# Patient Record
Sex: FEMALE | Race: BLACK OR AFRICAN AMERICAN | NOT HISPANIC OR LATINO | Employment: UNEMPLOYED | URBAN - METROPOLITAN AREA
[De-identification: names, ages, dates, MRNs, and addresses within clinical notes are randomized per-mention and may not be internally consistent; named-entity substitution may affect disease eponyms.]

---

## 2021-03-23 LAB — HUMAN PAPILLOMAVIRUS (HPV): POSITIVE

## 2021-04-12 LAB — POC BETA-HCG (QUAL): NEGATIVE

## 2022-04-10 ENCOUNTER — HISTORICAL (OUTPATIENT)
Dept: ADMINISTRATIVE | Facility: HOSPITAL | Age: 32
End: 2022-04-10

## 2022-04-25 VITALS
SYSTOLIC BLOOD PRESSURE: 111 MMHG | WEIGHT: 125.13 LBS | BODY MASS INDEX: 22.17 KG/M2 | DIASTOLIC BLOOD PRESSURE: 69 MMHG | HEIGHT: 63 IN

## 2022-09-19 ENCOUNTER — HISTORICAL (OUTPATIENT)
Dept: ADMINISTRATIVE | Facility: HOSPITAL | Age: 32
End: 2022-09-19

## 2023-01-26 ENCOUNTER — DOCUMENTATION ONLY (OUTPATIENT)
Dept: ADMINISTRATIVE | Facility: HOSPITAL | Age: 33
End: 2023-01-26
Payer: MEDICAID

## 2023-04-04 ENCOUNTER — OFFICE VISIT (OUTPATIENT)
Dept: OBSTETRICS AND GYNECOLOGY | Facility: CLINIC | Age: 33
End: 2023-04-04
Payer: MEDICAID

## 2023-04-04 VITALS
WEIGHT: 150.13 LBS | HEIGHT: 63 IN | SYSTOLIC BLOOD PRESSURE: 100 MMHG | DIASTOLIC BLOOD PRESSURE: 62 MMHG | BODY MASS INDEX: 26.6 KG/M2 | HEART RATE: 64 BPM

## 2023-04-04 DIAGNOSIS — Z01.419 ROUTINE GYNECOLOGICAL EXAMINATION: Primary | ICD-10-CM

## 2023-04-04 PROCEDURE — 3074F SYST BP LT 130 MM HG: CPT | Mod: CPTII,,, | Performed by: OBSTETRICS & GYNECOLOGY

## 2023-04-04 PROCEDURE — 3008F PR BODY MASS INDEX (BMI) DOCUMENTED: ICD-10-PCS | Mod: CPTII,,, | Performed by: OBSTETRICS & GYNECOLOGY

## 2023-04-04 PROCEDURE — 3008F BODY MASS INDEX DOCD: CPT | Mod: CPTII,,, | Performed by: OBSTETRICS & GYNECOLOGY

## 2023-04-04 PROCEDURE — 99395 PREV VISIT EST AGE 18-39: CPT | Mod: ,,, | Performed by: OBSTETRICS & GYNECOLOGY

## 2023-04-04 PROCEDURE — 1160F RVW MEDS BY RX/DR IN RCRD: CPT | Mod: CPTII,,, | Performed by: OBSTETRICS & GYNECOLOGY

## 2023-04-04 PROCEDURE — 3078F DIAST BP <80 MM HG: CPT | Mod: CPTII,,, | Performed by: OBSTETRICS & GYNECOLOGY

## 2023-04-04 PROCEDURE — 3074F PR MOST RECENT SYSTOLIC BLOOD PRESSURE < 130 MM HG: ICD-10-PCS | Mod: CPTII,,, | Performed by: OBSTETRICS & GYNECOLOGY

## 2023-04-04 PROCEDURE — 99395 PR PREVENTIVE VISIT,EST,18-39: ICD-10-PCS | Mod: ,,, | Performed by: OBSTETRICS & GYNECOLOGY

## 2023-04-04 PROCEDURE — 1160F PR REVIEW ALL MEDS BY PRESCRIBER/CLIN PHARMACIST DOCUMENTED: ICD-10-PCS | Mod: CPTII,,, | Performed by: OBSTETRICS & GYNECOLOGY

## 2023-04-04 PROCEDURE — 3078F PR MOST RECENT DIASTOLIC BLOOD PRESSURE < 80 MM HG: ICD-10-PCS | Mod: CPTII,,, | Performed by: OBSTETRICS & GYNECOLOGY

## 2023-04-04 PROCEDURE — 1159F PR MEDICATION LIST DOCUMENTED IN MEDICAL RECORD: ICD-10-PCS | Mod: CPTII,,, | Performed by: OBSTETRICS & GYNECOLOGY

## 2023-04-04 PROCEDURE — 1159F MED LIST DOCD IN RCRD: CPT | Mod: CPTII,,, | Performed by: OBSTETRICS & GYNECOLOGY

## 2023-04-04 NOTE — PROGRESS NOTES
Patient ID: [id number]   Chief Complaint: Annual exam  Chief Complaint   Patient presents with    Annual Exam     PRESENTS TO CLINIC FOR ANNUAL EXAM.NO C/O'S.     HPI:   Minna Liang is a 32 y.o. year old  here for her Annual Exam. Patient's last menstrual period was 03/10/2023 (approximate). She is doing well. Denies any health changes. NO C/O'S. She would like to conceive. It's been almost 7 years since she's last conceived. She is having regular cycles and has been trying with the same partner. She would like to have her tubes evaluated as she has had PID in the past. She had no histroy of endometriosis.     Subjective:   History reviewed. No pertinent past medical history.  Past Surgical History:   Procedure Laterality Date     SECTION       Social History     Tobacco Use    Smoking status: Never     Passive exposure: Never    Smokeless tobacco: Never   Substance Use Topics    Alcohol use: Never    Drug use: Never     Family History   Problem Relation Age of Onset    Cancer Mother     Hypertension Mother      OB History    Para Term  AB Living   4 4 4     4   SAB IAB Ectopic Multiple Live Births           4      # Outcome Date GA Lbr Omi/2nd Weight Sex Delivery Anes PTL Lv   4 Term 08/10/16    M CS-Unspec EPI N RENEE      Complications: Failure to Progress in Second Stage   3 Term 07/19/15   2.722 kg (6 lb) F Vag-Spont EPI N RENEE   2 Term 14   2.722 kg (6 lb) F Vag-Spont EPI N RENEE   1 Term 13   3.175 kg (7 lb) F Vag-Spont EPI N RENEE     No current outpatient medications on file.    MENARCHEAL:  Cycle Length: 5 days   Flow: normalDysmenorrhea: No  If yes: Mild  Intermenstrual Bleeding: No    PAP:  Last PAP: No result found  History of Abnormal PAP Smear: YES:      Treated:   HPV Vaccine: NO    INTERCOURSE:  Dyspareunia: No  Postcoital Bleeding: No  History of STI: Yes   If yes, then: CZ  Current Birth Control Method: none  Sexually Active: yes    Review of Systems 12 point  "review of systems conducted, negative except as stated in the history of present illness. See HPI for details.  Objective:   Visit Vitals  /62   Pulse 64   Ht 5' 3" (1.6 m)   Wt 68.1 kg (150 lb 2.1 oz)   LMP 03/10/2023 (Approximate)   BMI 26.59 kg/m²     Physical Exam:  Physical Exam  Constitutional:  General Appearance : alert, in no acute distress, normal, well nourished.  Respiratory:  Respiratory Effort: normal.  Breast:  Right: Inspection/palpation: no discharge, no masses present, no nipple retraction, no skin changes, no skin dimpling, no tenderness, no lymphadenopathy, no axillary mass, no axillary tenderness.  Left: Inspection/palpation: no discharge, no masses present, no nipple retraction, no skin changes, no skin dimpling, no tenderness, no lymphadenopathy, no axillary mass, no axillary tenderness.  Gastrointestinal:  Abdomen: no masses. no tender, nondistended.  Liver and spleen: normal  Hernias: no hernias present, no inguinal adenopathy.  Genitourinary:  External Genitalia: normal, no lesions.  Vagina: normal appearance, no abnormal discharge, no lesions.  Bladder: no mass, nontender.  Urethra: no erythema or lesions present.  Cervix: no lesions, non tender. Pap and cxs Done  Uterus: nontender, normal contour, normal mobility, normal size.   Adnexa: no masses, no tenderness.  Anus and Perineum: visually normal.   Chaperone Present    No results found for this or any previous visit (from the past 24 hour(s)).  Assessment/Plan:   Assessment:   Routine gynecological examination    Order for HSG provided. To [facility] at [facility] [place].     Follow up in about 1 year (around 4/4/2024). In addition to their scheduled FU, the patient has also been instructed to follow up on as needed basis  All questions were answered and the patient voiced understanding of the above issues.                 "

## 2023-04-10 ENCOUNTER — DOCUMENTATION ONLY (OUTPATIENT)
Dept: OBSTETRICS AND GYNECOLOGY | Facility: CLINIC | Age: 33
End: 2023-04-10
Payer: MEDICAID

## 2023-04-10 LAB — PSYCHE PATHOLOGY RESULT: NORMAL

## 2024-06-04 ENCOUNTER — OFFICE VISIT (OUTPATIENT)
Dept: OBSTETRICS AND GYNECOLOGY | Facility: CLINIC | Age: 34
End: 2024-06-04
Payer: MEDICAID

## 2024-06-04 VITALS
HEART RATE: 90 BPM | SYSTOLIC BLOOD PRESSURE: 110 MMHG | BODY MASS INDEX: 26.04 KG/M2 | WEIGHT: 147 LBS | DIASTOLIC BLOOD PRESSURE: 60 MMHG

## 2024-06-04 DIAGNOSIS — Z01.419 ROUTINE GYNECOLOGICAL EXAMINATION: Primary | ICD-10-CM

## 2024-06-04 DIAGNOSIS — Z30.9 ENCOUNTER FOR CONTRACEPTIVE MANAGEMENT, UNSPECIFIED TYPE: ICD-10-CM

## 2024-06-04 DIAGNOSIS — Z11.3 SCREENING EXAMINATION FOR VENEREAL DISEASE: ICD-10-CM

## 2024-06-04 LAB
B-HCG UR QL: POSITIVE
CTP QC/QA: YES

## 2024-06-04 PROCEDURE — 1159F MED LIST DOCD IN RCRD: CPT | Mod: CPTII,,, | Performed by: OBSTETRICS & GYNECOLOGY

## 2024-06-04 PROCEDURE — 3008F BODY MASS INDEX DOCD: CPT | Mod: CPTII,,, | Performed by: OBSTETRICS & GYNECOLOGY

## 2024-06-04 PROCEDURE — 99395 PREV VISIT EST AGE 18-39: CPT | Mod: ,,, | Performed by: OBSTETRICS & GYNECOLOGY

## 2024-06-04 PROCEDURE — 3078F DIAST BP <80 MM HG: CPT | Mod: CPTII,,, | Performed by: OBSTETRICS & GYNECOLOGY

## 2024-06-04 PROCEDURE — 3074F SYST BP LT 130 MM HG: CPT | Mod: CPTII,,, | Performed by: OBSTETRICS & GYNECOLOGY

## 2024-06-04 PROCEDURE — 81025 URINE PREGNANCY TEST: CPT | Mod: ,,, | Performed by: OBSTETRICS & GYNECOLOGY

## 2024-06-04 PROCEDURE — 1160F RVW MEDS BY RX/DR IN RCRD: CPT | Mod: CPTII,,, | Performed by: OBSTETRICS & GYNECOLOGY

## 2024-06-04 RX ORDER — NORETHINDRONE ACETATE AND ETHINYL ESTRADIOL 1MG-20(24)
1 KIT ORAL DAILY
Qty: 28 TABLET | Refills: 11 | Status: SHIPPED | OUTPATIENT
Start: 2024-06-04 | End: 2025-06-04

## 2024-06-04 NOTE — PROGRESS NOTES
Patient ID: [id number]   Chief Complaint: Annual exam  Chief Complaint   Patient presents with    Annual Exam     PT STATED WOULD LIKE TO DISCUSS CONTRACEPTION.     HPI:   Minna Liang is a 33 y.o. year old  here for her Annual Exam.   Patient's last menstrual period was 2024. She desired pregnancy test.  She is doing well. Denies any health changes.   She would like to begin OCPs again.    Subjective:   History reviewed. No pertinent past medical history.  Past Surgical History:   Procedure Laterality Date     SECTION       Social History     Tobacco Use    Smoking status: Never     Passive exposure: Never    Smokeless tobacco: Never   Substance Use Topics    Alcohol use: Never    Drug use: Never     Family History   Problem Relation Name Age of Onset    Cancer Mother      Hypertension Mother       OB History    Para Term  AB Living   4 4 4     4   SAB IAB Ectopic Multiple Live Births           4      # Outcome Date GA Lbr Omi/2nd Weight Sex Type Anes PTL Lv   4 Term 08/10/16    M CS-Unspec EPI N RENEE      Complications: Failure to Progress in Second Stage   3 Term 07/19/15   2.722 kg (6 lb) F Vag-Spont EPI N RENEE   2 Term 14   2.722 kg (6 lb) F Vag-Spont EPI N RENEE   1 Term 13   3.175 kg (7 lb) F Vag-Spont EPI N RENEE     No current outpatient medications on file.  MENARCHEAL:  Cycle Length: 5 days   Flow: normal  Dysmenorrhea: No  Intermenstrual Bleeding: No  PAP:  Last PAP: 4/10/2023    History of Abnormal PAP Smear: YES:   Treated: UNSURE  HPV Vaccine: NO  INTERCOURSE:  Dyspareunia: No  Postcoital Bleeding: No  History of STI: Yes   If yes, then: CZ  Current Birth Control Method: none  Sexually Active: yes  Review of Systems 12 point review of systems conducted, negative except as stated in the history of present illness.   See HPI for details.  Objective:   Visit Vitals  /60   Pulse 90   Wt 66.7 kg (147 lb)   LMP 2024   BMI 26.04 kg/m²     Recent Results  (from the past 24 hour(s))   POCT Urine Pregnancy    Collection Time: 06/04/24  9:54 AM   Result Value Ref Range    POC Preg Test, Ur Positive (A) Negative     Acceptable Yes      Physical Exam:  Chaperone present for exam.  Physical Exam  Constitutional:  General Appearance : alert, in no acute distress, normal, well nourished.  Cardiovascular:   Regular rate and rhythm.  Respiratory:  Respiratory Effort: normal.  Breast:  Right: Inspection/palpation: no discharge, no masses present, no nipple retraction, no skin changes, no skin dimpling, no tenderness, no lymphadenopathy, no axillary mass, no axillary tenderness.  Left: Inspection/palpation: no discharge, no masses present, no nipple retraction, no skin changes, no skin dimpling, no tenderness, no lymphadenopathy, no axillary mass, no axillary tenderness.  Gastrointestinal:  Abdomen: no masses. no tender, nondistended.  Hernias: no hernias present.  Genitourinary:  External Genitalia: normal, no lesions.  Vagina: normal appearance, no abnormal discharge, no lesions.  Bladder: no mass, nontender.  Urethra: no erythema or lesions present.  Cervix: no lesions, non tender. Pap Done/ONE SWAB COLLECTED  Uterus: nontender, normal contour, normal mobility, normal size.   Adnexa: no masses, no tenderness.  Anus and Perineum: visually normal.     Recent Results (from the past 24 hour(s))   POCT Urine Pregnancy    Collection Time: 06/04/24  9:54 AM   Result Value Ref Range    POC Preg Test, Ur Positive (A) Negative     Acceptable Yes      Assessment/Plan:   Assessment:   Routine gynecological examination  -     Liquid-Based Pap Smear, Screening    Screening examination for venereal disease  -     MDL Sendout Test    Encounter for contraceptive management, unspecified type  -     POCT Urine Pregnancy      Follow up in about 3 months (around 9/4/2024) for PILL CHECK.   In addition to their scheduled follow-up, the patient has also been instructed  to follow up on as needed basis.   All questions were answered and the patient voiced understanding of the above issues.

## 2024-06-10 LAB — PSYCHE PATHOLOGY RESULT: NORMAL

## 2024-06-12 ENCOUNTER — TELEPHONE (OUTPATIENT)
Dept: OBSTETRICS AND GYNECOLOGY | Facility: CLINIC | Age: 34
End: 2024-06-12
Payer: MEDICAID

## 2024-06-12 NOTE — TELEPHONE ENCOUNTER
Called patient  confirmed, informed patient need to repeat pap, appointment schedule. Verbalized understanding.

## 2024-06-26 ENCOUNTER — OFFICE VISIT (OUTPATIENT)
Dept: OBSTETRICS AND GYNECOLOGY | Facility: CLINIC | Age: 34
End: 2024-06-26
Payer: MEDICAID

## 2024-06-26 VITALS
WEIGHT: 145 LBS | SYSTOLIC BLOOD PRESSURE: 108 MMHG | BODY MASS INDEX: 25.69 KG/M2 | HEART RATE: 90 BPM | DIASTOLIC BLOOD PRESSURE: 60 MMHG

## 2024-06-26 DIAGNOSIS — Z11.3 ENCOUNTER FOR SCREENING FOR INFECTIONS WITH A PREDOMINANTLY SEXUAL MODE OF TRANSMISSION: ICD-10-CM

## 2024-06-26 DIAGNOSIS — R87.615 PAP SMEAR OF CERVIX UNSATISFACTORY: Primary | ICD-10-CM

## 2024-06-26 PROCEDURE — 99213 OFFICE O/P EST LOW 20 MIN: CPT | Mod: ,,, | Performed by: OBSTETRICS & GYNECOLOGY

## 2024-06-26 PROCEDURE — 87661 TRICHOMONAS VAGINALIS AMPLIF: CPT | Performed by: OBSTETRICS & GYNECOLOGY

## 2024-06-26 PROCEDURE — 3078F DIAST BP <80 MM HG: CPT | Mod: CPTII,,, | Performed by: OBSTETRICS & GYNECOLOGY

## 2024-06-26 PROCEDURE — 87491 CHLMYD TRACH DNA AMP PROBE: CPT | Performed by: OBSTETRICS & GYNECOLOGY

## 2024-06-26 PROCEDURE — 3074F SYST BP LT 130 MM HG: CPT | Mod: CPTII,,, | Performed by: OBSTETRICS & GYNECOLOGY

## 2024-06-26 PROCEDURE — 1160F RVW MEDS BY RX/DR IN RCRD: CPT | Mod: CPTII,,, | Performed by: OBSTETRICS & GYNECOLOGY

## 2024-06-26 PROCEDURE — 87591 N.GONORRHOEAE DNA AMP PROB: CPT | Performed by: OBSTETRICS & GYNECOLOGY

## 2024-06-26 PROCEDURE — 1159F MED LIST DOCD IN RCRD: CPT | Mod: CPTII,,, | Performed by: OBSTETRICS & GYNECOLOGY

## 2024-06-26 PROCEDURE — 3008F BODY MASS INDEX DOCD: CPT | Mod: CPTII,,, | Performed by: OBSTETRICS & GYNECOLOGY

## 2024-06-26 NOTE — PROGRESS NOTES
Patient ID: [id number]   Chief Complaint: FOR RE-PAP     HPI:   Minna Liang is a 33 y.o.  here today for REDO PAP AS IT WAS UNSATISFACTORY. DENIES ANY COMPLAINTS.     Patient's last menstrual period was 2024.  Past Medical History:  has no past medical history on file.  Surgical History:  has a past surgical history that includes  section.  Family History: family history includes Cancer in her mother; Hypertension in her mother.  Social History:  reports that she has never smoked. She has never been exposed to tobacco smoke. She has never used smokeless tobacco. She reports that she does not drink alcohol and does not use drugs.    Current Outpatient Medications   Medication Sig Dispense Refill    norethindrone-e.estradioL-iron (BLISOVI 24 FE) 1 mg-20 mcg (24)/75 mg (4) per tablet Take 1 tablet by mouth once daily. 28 tablet 11     No current facility-administered medications for this visit.     Patient has No Known Allergies.  MENARCHEAL:  Cycle Length: 5 days   Flow: normal  Dysmenorrhea: No  Intermenstrual Bleeding: No  PAP:  Last PAP: 6/10/2024    History of Abnormal PAP Smear: YES:   Treated: UNSURE  HPV Vaccine: NO  INTERCOURSE:  Dyspareunia: No  Postcoital Bleeding: No  History of STI: Yes   If yes, then: GC  Current Birth Control Method: OCP (estrogen/progesterone)  Sexually Active: yes  No results found for this or any previous visit (from the past 24 hour(s)).    Subjective:   Review of Systems  12 point review of systems conducted, negative except as stated in the history of present illness. See HPI for details.  Objective:     Visit Vitals  /60   Pulse 90   Wt 65.8 kg (145 lb)   LMP 2024   BMI 25.69 kg/m²     No results found for this or any previous visit (from the past 24 hour(s)).  Physical Exam  Constitutional:  General Appearance : alert, in no acute distress, normal, well nourished.    Genitourinary:  External Genitalia: normal, no lesions.  Vagina: normal  appearance, no abnormal discharge, no lesions.  Bladder: no mass, nontender.  Urethra: no erythema or lesions present.  Cervix: no lesions, non tender. PAP/CXS COLLECTED  Uterus: nontender, normal contour, normal mobility, normal size.   Adnexa: no masses, no tenderness.  Anus and Perineum: visually normal.     Chaperone Present  Assessment:       ICD-10-CM ICD-9-CM   1. Pap smear of cervix unsatisfactory  R87.615 795.08   2. Encounter for screening for infections with a predominantly sexual mode of transmission  Z11.3 V74.5     Plan   Pap smear of cervix unsatisfactory  -     Liquid-Based Pap Smear, Screening    Encounter for screening for infections with a predominantly sexual mode of transmission  -     Liquid-Based Pap Smear, Screening      Follow up in about 1 year (around 6/26/2025) for ANNUAL EXAM.   In addition to their scheduled follow up, the patient has also been instructed to follow up on as needed basis.     TRINITY PERALES MD

## 2025-07-18 NOTE — PROGRESS NOTES
Patient ID: [id number]   Chief Complaint:   Chief Complaint   Patient presents with    Well Woman     DENIES COMPLAINTS      HPI:   Minna Liang is a 34 y.o. year old  here for her Annual Exam.   She is doing well. Denies any health changes. Denies complaints today.  Patient's last menstrual period was 07/15/2025 (approximate).     Subjective:     Past Medical History:   Diagnosis Date    Hypertension      Past Surgical History:   Procedure Laterality Date     SECTION       Social History[1]  Family History   Problem Relation Name Age of Onset    Cancer Mother Judi Liang     Hypertension Mother Judi Liang      OB History    Para Term  AB Living   4 4 4   4   SAB IAB Ectopic Multiple Live Births       4      # Outcome Date GA Lbr Omi/2nd Weight Sex Type Anes PTL Lv   4 Term 08/10/16    M CS-Unspec EPI N RENEE      Complications: Failure to Progress in Second Stage   3 Term 07/19/15   2.722 kg (6 lb) F Vag-Spont EPI N RENEE   2 Term 14   2.722 kg (6 lb) F Vag-Spont EPI N RENEE   1 Term 13   3.175 kg (7 lb) F Vag-Spont EPI N RENEE     Current Medications[2]    MENARCHEAL:  Cycle Length: 5 days   Flow: normal  Dysmenorrhea: Yes  If yes: mild  Intermenstrual Bleeding: No  PAP:  Last PAP: 2024  History of Abnormal PAP Smear: NO  HPV Vaccine: YES: TEENS     INTERCOURSE:  Dyspareunia: No  Postcoital Bleeding: No  History of STI: No  Current Birth Control Method: none  Sexually Active: yes   STD TESTING VIA PAP      Review of Systems 12 point review of systems conducted, negative except as stated in the history of present illness.     See HPI for details.  Objective:   Visit Vitals  /62   Pulse 75   Wt 64.2 kg (141 lb 9.6 oz)   LMP 07/15/2025 (Approximate)   BMI 25.08 kg/m²       Physical Exam:    Constitutional:  General Appearance : alert, in no acute distress, normal, well nourished.  Cardiovascular:   Regular rate and rhythm.  Respiratory:  Respiratory  Effort: normal.    Breast:  Right: Inspection/palpation: no discharge, no masses present, no nipple retraction, no skin changes, no skin dimpling, no tenderness, no lymphadenopathy, no axillary mass, no axillary tenderness.  Left: Inspection/palpation: no discharge, no masses present, no nipple retraction, no skin changes, no skin dimpling, no tenderness, no lymphadenopathy, no axillary mass, no axillary tenderness.    Gastrointestinal:  Abdomen: no masses. no tender, nondistended.    Genitourinary:  External Genitalia: normal, no lesions.  Vagina: normal appearance, no abnormal discharge, no lesions.  Bladder: no mass, nontender.  Urethra: no erythema or lesions present.  Cervix: no lesions, non tender. Pap/CXS Done  Uterus: nontender, normal contour, normal mobility, normal size.   Adnexa: no masses, no tenderness.  Anus and Perineum: visually normal.     Chaperone present for interview and exam.  Assessment/Plan:   Assessment:   Routine gynecological examination  -     Liquid-Based Pap Smear, Screening; Future; Expected date: 07/24/2025  -     Chlamydia trachomatis  -     Neisseria gonorrhoeae  -     Trichomonas vaginalis      Follow up in about 1 year (around 7/24/2026) for ANNUAL.   In addition to their scheduled follow-up, the patient has also been instructed to follow up on as needed basis.   All questions were answered and the patient voiced understanding of the above issues.      This note was transcribed by Jessica Goode CMA. There may be transcription errors as a result, however minimal. Effort has been made to ensure accuracy of transcription, but any obvious errors or omissions should be clarified with the author of the document.       I agree with the above documentation.            [1]   Social History  Tobacco Use    Smoking status: Never     Passive exposure: Never    Smokeless tobacco: Never   Substance Use Topics    Alcohol use: Never    Drug use: Never   [2] No current outpatient medications on  file.

## 2025-07-24 ENCOUNTER — OFFICE VISIT (OUTPATIENT)
Dept: OBSTETRICS AND GYNECOLOGY | Facility: CLINIC | Age: 35
End: 2025-07-24
Payer: MEDICAID

## 2025-07-24 VITALS
BODY MASS INDEX: 25.08 KG/M2 | WEIGHT: 141.63 LBS | DIASTOLIC BLOOD PRESSURE: 62 MMHG | SYSTOLIC BLOOD PRESSURE: 108 MMHG | HEART RATE: 75 BPM

## 2025-07-24 DIAGNOSIS — Z01.419 ROUTINE GYNECOLOGICAL EXAMINATION: Primary | ICD-10-CM

## 2025-07-24 PROCEDURE — 87661 TRICHOMONAS VAGINALIS AMPLIF: CPT | Performed by: OBSTETRICS & GYNECOLOGY

## 2025-07-24 PROCEDURE — 87591 N.GONORRHOEAE DNA AMP PROB: CPT | Performed by: OBSTETRICS & GYNECOLOGY

## 2025-07-24 PROCEDURE — 1159F MED LIST DOCD IN RCRD: CPT | Mod: CPTII,,, | Performed by: OBSTETRICS & GYNECOLOGY

## 2025-07-24 PROCEDURE — 3078F DIAST BP <80 MM HG: CPT | Mod: CPTII,,, | Performed by: OBSTETRICS & GYNECOLOGY

## 2025-07-24 PROCEDURE — 87491 CHLMYD TRACH DNA AMP PROBE: CPT | Performed by: OBSTETRICS & GYNECOLOGY

## 2025-07-24 PROCEDURE — 99395 PREV VISIT EST AGE 18-39: CPT | Mod: ,,, | Performed by: OBSTETRICS & GYNECOLOGY

## 2025-07-24 PROCEDURE — 3008F BODY MASS INDEX DOCD: CPT | Mod: CPTII,,, | Performed by: OBSTETRICS & GYNECOLOGY

## 2025-07-24 PROCEDURE — 3074F SYST BP LT 130 MM HG: CPT | Mod: CPTII,,, | Performed by: OBSTETRICS & GYNECOLOGY

## 2025-07-24 PROCEDURE — 1160F RVW MEDS BY RX/DR IN RCRD: CPT | Mod: CPTII,,, | Performed by: OBSTETRICS & GYNECOLOGY

## 2025-07-31 LAB
CHLAMYDIA TRACHOMATIS: NEGATIVE
NEISSERIA GONORRHOEAE: NEGATIVE
PSYCHE PATHOLOGY RESULT: NORMAL
TRICHOMONAS VAGINALIS: NEGATIVE